# Patient Record
(demographics unavailable — no encounter records)

---

## 2017-01-15 NOTE — EDDOCDS
Physician Documentation                                                                           

[facility]                                                                         

Name: Natalya Magana                                                                               

Age: 4 yrs                                                                                        

Sex: Female                                                                                       

: 2012                                                                                   

MRN: [id number]                                                                                     

Arrival Date: 2017                                                                          

Time: 23:11                                                                                       

Account#: [id number]                                                                              

Bed 5                                                                                             

Private MD: Constantine Mancini                                                                           

Disposition:                                                                                      

01/15/17 01:26 Discharged to Home/Self Care. Impression: Vomiting.                                

- Condition is Stable.                                                                            

                                                                                                  

                                                                                                  

- Medication Reconciliation, Local Pharmacy Hours form.                                           

- Follow up: Constantine Mancini; When: 1 - 2 days; Reason: Recheck today's complaints.                   

- Problem is new.                                                                                 

- Symptoms have improved.                                                                         

                                                                                                  

                                                                                                  

                                                                                                  

Historical:                                                                                       

- Allergies: No known drug Allergies;                                                             

- Home Meds:                                                                                      

1. none                                                                                         

- PMHx: Meningitis;                                                                               

- PSHx: none;                                                                                     

- Social history: No barriers to communication noted, The patient speaks fluent                   

English, Speaks appropriately for age.                                                          

- Family history: Brother has/had vomiting.                                                       

- : The pt / caregiver states he / she is not on anticoagulants. Home medication list             

is obtained from family members, The child is not immunized per parent choice.                  

- Exposure Risk Screening:: None identified.                                                      

                                                                                                  

                                                                                                  

Vital Signs:                                                                                      

                                                                                             

23:27  / 73; Pulse 141; Resp 20; Temp 100(TE); Pulse Ox 98% on R/A; Weight 12.25 kg /   jmv 

      27 lbs 0 oz (R); Height 3 ft. (91.44 cm) (R); Pain 2/5;                                     

01/15                                                                                             

01:47  / 59; Pulse 147; Resp 22; Temp 96.0(T); Pulse Ox 98% on R/A;                     nn1 

                                                                                             

23:27 Body Mass Index 14.65 (12.25 kg, 91.44 cm)                                              jm 

                                                                                                  

MDM:                                                                                              

00:40 Financial registration complete.                                                        hs2 

                                                                                                  

Signatures:                                                                                       

Ector Lewis DO                       DO   cs11                                                 

Tiffanie Quiles,RN                        RN   nn1                                                  

Erika Frazier, Reg                   Reg  hs2                                                  

                                                                                                  

**************************************************************************************************

MTDD

## 2017-01-15 NOTE — EDDOCDS
Nurse's Notes                                                                                     

[facility]                                                                         

Name: Natalya Magana                                                                               

Age: 4 yrs                                                                                        

Sex: Female                                                                                       

: 2012                                                                                   

MRN: [id number]                                                                                     

Arrival Date: 2017                                                                          

Time: 23:11                                                                                       

Account#: [id number]                                                                              

Bed 5                                                                                             

Private MD: Constantine Mancini                                                                           

Diagnosis: Vomiting                                                                               

                                                                                                  

Presentation:                                                                                     

                                                                                             

23:14 Presenting complaint: EMS states: patient has been complaining of belly pain for a      nn1 

      couple of hours. Mother reports projectile vomiting, red in color. Reports patient          

      drank red koolaid today. Patient has hx of acid reflux when she was a baby.                 

23:17 Suicide/Homicide risk assessment- the patient denies having any suicidal and/or         nn1 

      homicidal ideations and does not present with any other emotional, behavioral or mental     

      health complaints.  Status: Patient is not a  or              

      dependent. Transition of care: patient was not received from another setting of care.       

23:17 Acuity: GOYO Level 3                                                                     nn1 

23:17 Method Of Arrival: Ambulance                                                            nn1 

                                                                                                  

Triage Assessment:                                                                                

23:18 General: Appears in no apparent distress, Behavior is quiet. General: Mother reports    nn1 

      patient vomited twice yesterday and twice today. Mother reports patients brother was        

      throwing up yesterday as well. . Pain: Unable to use pain scale. Patient appears quiet,     

      FLACC scale score is 0 out of 10. The patient is triaged at the bedside. See Assessment     

      in Nurses Notes section of ED record. Neurological: Level of Consciousness is awake,        

      alert. Respiratory: Airway is patent Respiratory effort is even, unlabored, Respiratory     

      pattern is regular. GI: Abdomen is non- distended Bowel sounds present X 4 quads. Abd       

      is soft and non tender X 4 quads. Parent/caregiver reports the patient having vomiting.     

      Derm: Skin is normal.                                                                       

                                                                                                  

Historical:                                                                                       

- Allergies: No known drug Allergies;                                                             

- Home Meds:                                                                                      

1. none                                                                                         

- PMHx: Meningitis;                                                                               

- PSHx: none;                                                                                     

- Social history: No barriers to communication noted, The patient speaks fluent                   

English, Speaks appropriately for age.                                                          

- Family history: Brother has/had vomiting.                                                       

- : The pt / caregiver states he / she is not on anticoagulants. Home medication list             

is obtained from family members, The child is not immunized per parent choice.                  

- Exposure Risk Screening:: None identified.                                                      

                                                                                                  

                                                                                                  

Screenin/15                                                                                             

01:45 Screening information is obtained from the parent. Fall risk: No risks identified.      nn1 

      Abuse/DV Screen: The patient / caregiver reports he/she is: not in a situation that         

      causes fear, pain or injury. Nutritional screening: No deficits noted. home support is      

      adequate.                                                                                   

                                                                                                  

Assessment:                                                                                       

                                                                                             

23:20 General: See triage assessment. Parent denies diarrhea. Parent reports patient and      nn1 

      brother began vomiting yesterday after eating eggs. Patient not actively vomiting at        

      this time. Patient is cooperative. . GI: Abdomen is non- distended. GI: Bowel sounds        

      present X 4 quads.                                                                          

23:21 General: Patient given popsicle by provider at this time. . GI: Parent/caregiver        nn1 

      reports the patient having vomiting. No Injury is noted or reported. The interaction        

      between the parent and child appears to be appropriate. No prior history available.         

23:23 General: Parent reports patient has been less active today. States patients last        nn1 

      episode of vomiting was tonight when she was asleep. .                                      

23:54 General: Patient ate popsicle, no vomiting at this time..                               nn1 

01/15                                                                                             

00:44 General: Appears in no apparent distress, comfortable, Behavior is appropriate for age, nn1 

      cooperative. Pain: Unable to use pain scale. FLACC scale score is 0 out of 10.              

      Neurological: Level of Consciousness is awake, alert. GI: Abdomen is non- distended.        

      Derm: Skin is normal.                                                                       

01:44 General: Appears in no apparent distress, comfortable, Behavior is appropriate for age, nn1 

      cooperative. General: Patient given apple juice. Patient showing no signs of pain at        

      this time.. Pain: Unable to use pain scale. FLACC scale score is 0 out of 10.               

      Neurological: Level of Consciousness is awake, alert. Derm: Skin is normal.                 

                                                                                                  

Vital Signs:                                                                                      

                                                                                             

23:27  / 73; Pulse 141; Resp 20; Temp 100(TE); Pulse Ox 98% on R/A; Weight 12.25 kg     jmv 

      (R); Height 3 ft. (91.44 cm) (R); Pain 2/5;                                                 

01/15                                                                                             

01:47  / 59; Pulse 147; Resp 22; Temp 96.0(T); Pulse Ox 98% on R/A;                     nn1 

                                                                                             

23:27 Body Mass Index 14.65 (12.25 kg, 91.44 cm)                                              jmv 

                                                                                                  

Vitals:                                                                                           

                                                                                             

23:14 Log In Time N/A - ambulance arrival. Does not meet SIRS criteria.                       nn1 

01/15                                                                                             

01:46 Growth chart printed and placed in chart.                                               nn1 

                                                                                                  

ED Course:                                                                                        

                                                                                             

23:12 Patient visited by Aida Goode PCA.                                                tmm1

23:12 Constantine Mancini is Private Physician.                                                       tmm1

23:12 [facility]                                                            le  

23:12 Patient moved to Waiting                                                                tmm1

23:12 Patient moved to 5                                                                      tmm1

23:16 Patient visited by Renee Nix FNP.                                                 le  

23:16 Patient visited by Renee Nix FNP.                                                 le  

23:17 Triage Initiated                                                                        nn1 

23:28 Pt greeted and oriented to ED. Patient advised of names of staff involved in care,      [facility] 

      location of call bell, wait times and NPO status. Accompanied by Family Member, Patient     

      has correct armband on for positive identification. Bed in low position. Call light in      

      reach. Side rails up X2. Adult w/ patient.                                                  

23:29 Patient visited by Bruno Mccormick PCA.                                                     [facility] 

01/15                                                                                             

00:10 Patient visited by Tiffanie Quiles,FÁTIMA.                                                    nn1 

00:45 Patient visited by Tiffanie Quiles,FÁTIMA.                                                    nn1 

01:21 Patient visited by Tiffanie Quiles,FÁTIMA.                                                    nn1 

01:26 Ector Lewis DO is Attending Physician.                                               cs11

01:26 Constantine Mancini is Referral Physician.                                                      cs11

01:45 No IV's were initiated during this patient's visit. No procedures done that require     nn1 

      assistance.                                                                                 

01:46 The patient / caregiver is instructed regarding the plan of care and ED course.         nn1 

                                                                                                  

Order Results:                                                                                    

There are currently no results for this order.                                                    

Outcome:                                                                                          

01:26 Discharge ordered by Provider.                                                          cs11

01:45 Discharge Assessment: Patient awake, alert and oriented x 3. No cognitive and/or        nn1 

      functional deficits noted. Patient verbalized understanding of disposition                  

      instructions. The following High Risk Discharge criteria are identified: None.              

      Discharged to home ambulatory, with parent. Condition: stable. No special radiology         

      studies were completed. Property :Personal belongings accompany Pt.                         

01:47 Patient left the ED.                                                                    nn1 

                                                                                                  

Signatures:                                                                                       

Renee Nix FNP FNP le Schiff, Craig, DO                       DO   cs11                                                 

Rupa Aida, PCA                    PCA  tmm1                                                 

Kb,Tiffanie,RN                        RN   nn1                                                  

Bruno Mccormick, PCA                         PCA  jmv                                                  

                                                                                                  

**************************************************************************************************

MTDD

## 2017-01-17 NOTE — EDDOCDS
Physician Documentation                                                                           

[facility]                                                                         

Name: Natalya Magana                                                                               

Age: 4 yrs                                                                                        

Sex: Female                                                                                       

: 2012                                                                                   

MRN: [id number]                                                                                     

Arrival Date: 2017                                                                          

Time: 23:11                                                                                       

Account#: [id number]                                                                              

Bed 5                                                                                             

Private MD: Constantine Mancini                                                                           

Disposition:                                                                                      

01/15/17 01:26 Discharged to Home/Self Care. Impression: Vomiting.                                

- Condition is Stable.                                                                            

                                                                                                  

                                                                                                  

- Medication Reconciliation, Local Pharmacy Hours form.                                           

- Follow up: Constantine Mancini; When: 1 - 2 days; Reason: Recheck today's complaints.                   

- Problem is new.                                                                                 

- Symptoms have improved.                                                                         

                                                                                                  

                                                                                                  

                                                                                                  

Historical:                                                                                       

- Allergies: No known drug Allergies;                                                             

- Home Meds:                                                                                      

1. none                                                                                         

- PMHx: Meningitis;                                                                               

- PSHx: none;                                                                                     

- Social history: No barriers to communication noted, The patient speaks fluent                   

English, Speaks appropriately for age.                                                          

- Family history: Brother has/had vomiting.                                                       

- : The pt / caregiver states he / she is not on anticoagulants. Home medication list             

is obtained from family members, The child is not immunized per parent choice.                  

- Exposure Risk Screening:: None identified.                                                      

                                                                                                  

                                                                                                  

Vital Signs:                                                                                      

                                                                                             

23:27  / 73; Pulse 141; Resp 20; Temp 100(TE); Pulse Ox 98% on R/A; Weight 12.25 kg /   jmv 

      27 lbs 0 oz (R); Height 3 ft. (91.44 cm) (R); Pain 2/5;                                     

01/15                                                                                             

01:47  / 59; Pulse 147; Resp 22; Temp 96.0(T); Pulse Ox 98% on R/A;                     nn1 

                                                                                             

23:27 Body Mass Index 14.65 (12.25 kg, 91.44 cm)                                              jm 

                                                                                                  

MDM:                                                                                              

00:40 Financial registration complete.                                                        hs2 

02:02 [facility] Payment Agreement was scanned into [email or web address] and attached to record.               hs2 

09:23 T-Sheet-- Draft Copy was scanned into [email or web address] and attached to record.                   [facility] 

                                                                                                  

Signatures:                                                                                       

Ector Lewis DO                       DO   cs11                                                 

Tiffanie Quiles,RN                        RN   nn1                                                  

Erika Frazier, Reg                   Reg  hs2                                                  

Marilyn Lucas                                                  

                                                                                                  

The chart was reviewed and I authenticate all verbal orders and agree with the evaluation and 
treatment provided.Attachments:

02:02 [facility] Payment Agreement                                                                hs2 

09:23 T-Sheet-- Draft Copy                                                                    seh 

                                                                                                  

**************************************************************************************************



*** Chart Complete ***
MTDD

## 2017-01-17 NOTE — EDDOCDS
Nurse's Notes                                                                                     

[facility]                                                                         

Name: Natalya Magana                                                                               

Age: 4 yrs                                                                                        

Sex: Female                                                                                       

: 2012                                                                                   

MRN: [id number]                                                                                     

Arrival Date: 2017                                                                          

Time: 23:11                                                                                       

Account#: [id number]                                                                              

Bed 5                                                                                             

Private MD: Constantine Mancini                                                                           

Diagnosis: Vomiting                                                                               

                                                                                                  

Presentation:                                                                                     

                                                                                             

23:14 Presenting complaint: EMS states: patient has been complaining of belly pain for a      nn1 

      couple of hours. Mother reports projectile vomiting, red in color. Reports patient          

      drank red koolaid today. Patient has hx of acid reflux when she was a baby.                 

23:17 Suicide/Homicide risk assessment- the patient denies having any suicidal and/or         nn1 

      homicidal ideations and does not present with any other emotional, behavioral or mental     

      health complaints.  Status: Patient is not a  or              

      dependent. Transition of care: patient was not received from another setting of care.       

23:17 Acuity: GOYO Level 3                                                                     nn1 

23:17 Method Of Arrival: Ambulance                                                            nn1 

                                                                                                  

Triage Assessment:                                                                                

23:18 General: Appears in no apparent distress, Behavior is quiet. General: Mother reports    nn1 

      patient vomited twice yesterday and twice today. Mother reports patients brother was        

      throwing up yesterday as well. . Pain: Unable to use pain scale. Patient appears quiet,     

      FLACC scale score is 0 out of 10. The patient is triaged at the bedside. See Assessment     

      in Nurses Notes section of ED record. Neurological: Level of Consciousness is awake,        

      alert. Respiratory: Airway is patent Respiratory effort is even, unlabored, Respiratory     

      pattern is regular. GI: Abdomen is non- distended Bowel sounds present X 4 quads. Abd       

      is soft and non tender X 4 quads. Parent/caregiver reports the patient having vomiting.     

      Derm: Skin is normal.                                                                       

                                                                                                  

Historical:                                                                                       

- Allergies: No known drug Allergies;                                                             

- Home Meds:                                                                                      

1. none                                                                                         

- PMHx: Meningitis;                                                                               

- PSHx: none;                                                                                     

- Social history: No barriers to communication noted, The patient speaks fluent                   

English, Speaks appropriately for age.                                                          

- Family history: Brother has/had vomiting.                                                       

- : The pt / caregiver states he / she is not on anticoagulants. Home medication list             

is obtained from family members, The child is not immunized per parent choice.                  

- Exposure Risk Screening:: None identified.                                                      

                                                                                                  

                                                                                                  

Screenin/15                                                                                             

01:45 Screening information is obtained from the parent. Fall risk: No risks identified.      nn1 

      Abuse/DV Screen: The patient / caregiver reports he/she is: not in a situation that         

      causes fear, pain or injury. Nutritional screening: No deficits noted. home support is      

      adequate.                                                                                   

                                                                                                  

Assessment:                                                                                       

                                                                                             

23:20 General: See triage assessment. Parent denies diarrhea. Parent reports patient and      nn1 

      brother began vomiting yesterday after eating eggs. Patient not actively vomiting at        

      this time. Patient is cooperative. . GI: Abdomen is non- distended. GI: Bowel sounds        

      present X 4 quads.                                                                          

23:21 General: Patient given popsicle by provider at this time. . GI: Parent/caregiver        nn1 

      reports the patient having vomiting. No Injury is noted or reported. The interaction        

      between the parent and child appears to be appropriate. No prior history available.         

23:23 General: Parent reports patient has been less active today. States patients last        nn1 

      episode of vomiting was tonight when she was asleep. .                                      

23:54 General: Patient ate popsicle, no vomiting at this time..                               nn1 

01/15                                                                                             

00:44 General: Appears in no apparent distress, comfortable, Behavior is appropriate for age, nn1 

      cooperative. Pain: Unable to use pain scale. FLACC scale score is 0 out of 10.              

      Neurological: Level of Consciousness is awake, alert. GI: Abdomen is non- distended.        

      Derm: Skin is normal.                                                                       

01:44 General: Appears in no apparent distress, comfortable, Behavior is appropriate for age, nn1 

      cooperative. General: Patient given apple juice. Patient showing no signs of pain at        

      this time.. Pain: Unable to use pain scale. FLACC scale score is 0 out of 10.               

      Neurological: Level of Consciousness is awake, alert. Derm: Skin is normal.                 

                                                                                                  

Vital Signs:                                                                                      

                                                                                             

23:27  / 73; Pulse 141; Resp 20; Temp 100(TE); Pulse Ox 98% on R/A; Weight 12.25 kg     jmv 

      (R); Height 3 ft. (91.44 cm) (R); Pain 2/5;                                                 

01/15                                                                                             

01:47  / 59; Pulse 147; Resp 22; Temp 96.0(T); Pulse Ox 98% on R/A;                     nn1 

                                                                                             

23:27 Body Mass Index 14.65 (12.25 kg, 91.44 cm)                                              jmv 

                                                                                                  

Vitals:                                                                                           

                                                                                             

23:14 Log In Time N/A - ambulance arrival. Does not meet SIRS criteria.                       nn1 

01/15                                                                                             

01:46 Growth chart printed and placed in chart.                                               nn1 

                                                                                                  

ED Course:                                                                                        

                                                                                             

23:12 Patient visited by Aida Goode PCA.                                                tmm1

23:12 Constantine Mancini is Private Physician.                                                       tmm1

23:12 [facility]                                                            le  

23:12 Patient moved to Waiting                                                                tmm1

23:12 Patient moved to 5                                                                      tmm1

23:16 Patient visited by Renee Nix FNP.                                                 le  

23:16 Patient visited by Renee Nix FNP.                                                 le  

23:17 Triage Initiated                                                                        nn1 

23:28 Pt greeted and oriented to ED. Patient advised of names of staff involved in care,      [facility] 

      location of call bell, wait times and NPO status. Accompanied by Family Member, Patient     

      has correct armband on for positive identification. Bed in low position. Call light in      

      reach. Side rails up X2. Adult w/ patient.                                                  

23:29 Patient visited by Bruno Mccormick PCA.                                                     [facility] 

01/15                                                                                             

00:10 Patient visited by Tiffanie Quiles,FÁTIMA.                                                    nn1 

00:45 Patient visited by Tiffanie Quiles,FÁTIMA.                                                    nn1 

01:21 Patient visited by Tiffanie Quiles,FÁTIMA.                                                    nn1 

01:26 Ector Lewis DO is Attending Physician.                                               cs11

01:26 Constantine Mancini is Referral Physician.                                                      cs11

01:45 No IV's were initiated during this patient's visit. No procedures done that require     nn1 

      assistance.                                                                                 

01:46 The patient / caregiver is instructed regarding the plan of care and ED course.         nn1 

01:56 Patient name changed from Ta'tao\S\\S\Chino\S\ to Ta'tao\S\Sha'brittany\S\[place].                
   EDMS

02:02 [facility] Payment Agreement was scanned into [employer] and attached to record.               hs2 

09:23 T-Sheet-- Draft Copy was scanned into [employer] and attached to record.                   [facility] 

                                                                                                  

Order Results:                                                                                    

There are currently no results for this order.                                                    

Outcome:                                                                                          

01:26 Discharge ordered by Provider.                                                          cs11

01:45 Discharge Assessment: Patient awake, alert and oriented x 3. No cognitive and/or        nn1 

      functional deficits noted. Patient verbalized understanding of disposition                  

      instructions. The following High Risk Discharge criteria are identified: None.              

      Discharged to home ambulatory, with parent. Condition: stable. No special radiology         

      studies were completed. Property :Personal belongings accompany Pt.                         

01:47 Patient left the ED.                                                                    nn1 

                                                                                                  

Signatures:                                                                                       

Dispatcher [employer]                           EDMS                                                 

Renee Nix, BROOKE                     FNEctor Fontanez,                        DO   cs11                                                 

Aida Goode, PCA                    PCA  tmm1                                                 

Tiffanie Quiles RN                        RN   nn1                                                  

Erika Frazier, Reg                   Reg  hs2                                                  

Marilyn Lucas Jose, PCA                         PCA  jmv                                                  

                                                                                                  

**************************************************************************************************



*** Chart Complete ***
MTDD

## 2017-01-17 NOTE — EDDOCDS
Physician Documentation                                                                           

[facility]                                                                         

Name: Natalya Magana                                                                               

Age: 4 yrs                                                                                        

Sex: Female                                                                                       

: 2012                                                                                   

MRN: [id number]                                                                                     

Arrival Date: 2017                                                                          

Time: 23:11                                                                                       

Account#: [id number]                                                                              

Bed 5                                                                                             

Private MD: Constantine Mancini                                                                           

Disposition:                                                                                      

01/15/17 01:26 Discharged to Home/Self Care. Impression: Vomiting.                                

- Condition is Stable.                                                                            

                                                                                                  

                                                                                                  

- Medication Reconciliation, Local Pharmacy Hours form.                                           

- Follow up: Constantine Mancini; When: 1 - 2 days; Reason: Recheck today's complaints.                   

- Problem is new.                                                                                 

- Symptoms have improved.                                                                         

                                                                                                  

                                                                                                  

                                                                                                  

Historical:                                                                                       

- Allergies: No known drug Allergies;                                                             

- Home Meds:                                                                                      

1. none                                                                                         

- PMHx: Meningitis;                                                                               

- PSHx: none;                                                                                     

- Social history: No barriers to communication noted, The patient speaks fluent                   

English, Speaks appropriately for age.                                                          

- Family history: Brother has/had vomiting.                                                       

- : The pt / caregiver states he / she is not on anticoagulants. Home medication list             

is obtained from family members, The child is not immunized per parent choice.                  

- Exposure Risk Screening:: None identified.                                                      

                                                                                                  

                                                                                                  

Vital Signs:                                                                                      

                                                                                             

23:27  / 73; Pulse 141; Resp 20; Temp 100(TE); Pulse Ox 98% on R/A; Weight 12.25 kg /   jmv 

      27 lbs 0 oz (R); Height 3 ft. (91.44 cm) (R); Pain 2/5;                                     

01/15                                                                                             

01:47  / 59; Pulse 147; Resp 22; Temp 96.0(T); Pulse Ox 98% on R/A;                     nn1 

                                                                                             

23:27 Body Mass Index 14.65 (12.25 kg, 91.44 cm)                                              jm 

                                                                                                  

MDM:                                                                                              

00:40 Financial registration complete.                                                        hs2 

02:02 [facility] Payment Agreement was scanned into [employer] and attached to record.               hs2 

09:23 T-Sheet-- Draft Copy was scanned into [employer] and attached to record.                   [facility] 

                                                                                                  

Signatures:                                                                                       

Ector Lewis DO                       DO   [id number]                                                 

Tiffanie Quiles,RN                        RN   nn1                                                  

Erika Frazier, Reg                   Reg  hs2                                                  

Marilyn Lucas                                                  

                                                                                                  

The chart was reviewed and I authenticate all verbal orders and agree with the evaluation and 
treatment provided.Attachments:

02:02 [facility] Payment Agreement                                                                hs2 

09:23 T-Sheet-- Draft Copy                                                                    seh 

                                                                                                  

**************************************************************************************************



*** Chart Complete ***
MTDD